# Patient Record
Sex: MALE | Race: WHITE | NOT HISPANIC OR LATINO | Employment: UNEMPLOYED | URBAN - METROPOLITAN AREA
[De-identification: names, ages, dates, MRNs, and addresses within clinical notes are randomized per-mention and may not be internally consistent; named-entity substitution may affect disease eponyms.]

---

## 2017-03-14 ENCOUNTER — INITIAL CONSULT (OUTPATIENT)
Dept: OPHTHALMOLOGY | Facility: CLINIC | Age: 57
End: 2017-03-14
Payer: MEDICAID

## 2017-03-14 VITALS — DIASTOLIC BLOOD PRESSURE: 86 MMHG | SYSTOLIC BLOOD PRESSURE: 130 MMHG | HEART RATE: 93 BPM

## 2017-03-14 DIAGNOSIS — H33.022 RETINAL DETACHMENT OF LEFT EYE WITH MULTIPLE RETINAL TEARS: ICD-10-CM

## 2017-03-14 PROCEDURE — 99213 OFFICE O/P EST LOW 20 MIN: CPT | Mod: PBBFAC | Performed by: OPHTHALMOLOGY

## 2017-03-14 PROCEDURE — 76512 OPH US DX B-SCAN: CPT | Mod: 50,PBBFAC | Performed by: OPHTHALMOLOGY

## 2017-03-14 PROCEDURE — 99999 PR PBB SHADOW E&M-EST. PATIENT-LVL III: CPT | Mod: PBBFAC,,, | Performed by: OPHTHALMOLOGY

## 2017-03-14 PROCEDURE — 92225 PR SPECIAL EYE EXAM, INITIAL: CPT | Mod: 50,PBBFAC | Performed by: OPHTHALMOLOGY

## 2017-03-14 PROCEDURE — 92225 PR SPECIAL EYE EXAM, INITIAL: CPT | Mod: S$PBB,LT,, | Performed by: OPHTHALMOLOGY

## 2017-03-14 PROCEDURE — 92004 COMPRE OPH EXAM NEW PT 1/>: CPT | Mod: S$PBB,,, | Performed by: OPHTHALMOLOGY

## 2017-03-14 RX ORDER — NAPROXEN 500 MG/1
TABLET ORAL
COMMUNITY
Start: 2016-12-20

## 2017-03-14 NOTE — LETTER
March 14, 2017      Paul Lemon MD  [address]  [address]  [facility] [place] [address]           [facility]  [address]  [place] [address]  Phone: [phone]  Fax: [phone]          Patient: Christofer Brenner   MR Number: [id number]   YOB: 1960   Date of Visit: 3/14/2017       Dear Dr. Paul Lemon:    Thank you for referring Christofer Brenner to me for evaluation. Attached you will find relevant portions of my assessment and plan of care.    If you have questions, please do not hesitate to call me. I look forward to following Christofer Brenner along with you.    Sincerely,    ROZ Castillo MD    Enclosure  CC:  No Recipients    If you would like to receive this communication electronically, please contact [email or web address] or [phone] to request more information on [employer] access.    For providers and/or their staff who would like to refer a patient to [facility], please contact us through our one-stop-shop provider referral line, [facility], at [phone].    If you feel you have received this communication in error or would no longer like to receive these types of communications, please e-mail [email or web address]
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
## 2017-03-14 NOTE — MR AVS SNAPSHOT
[facility] - Ophthalmology  [address]  [facility] [address]  Phone: [phone]  Fax: [phone]                  Christofer Brenner   3/14/2017 10:30 AM   Initial consult    Description:  Male : 1960   Provider:  ROZ Castillo MD   Department:  [facility] Ophthal[facility]           Reason for Visit     Concerns About Ocular Health           Diagnoses this Visit        Comments    Retinal detachment of left eye with multiple retinal tears                To Do List           Goals (5 Years of Data)     None      [facility]     [facility] Nurse Care Line -  Assistance  Registered nurses in the [facility] Center provide clinical advisement, health education, appointment booking, and other advisory services.  Call for this free service at [phone].             Medications           Message regarding Medications     Verify the changes and/or additions to your medication regime listed below are the same as discussed with your clinician today.  If any of these changes or additions are incorrect, please notify your healthcare provider.             Verify that the below list of medications is an accurate representation of the medications you are currently taking.  If none reported, the list may be blank. If incorrect, please contact your healthcare provider. Carry this list with you in case of emergency.           Current Medications     cyclobenzaprine (FLEXERIL) 10 MG tablet Take 10 mg by mouth 2 (two) times daily.    diazepam (VALIUM) 10 MG Tab Take 10 mg by mouth 2 (two) times daily.     hydrocodone-acetaminophen 10-325mg (NORCO)  mg Tab Take by mouth 3 (three) times daily as needed.    naproxen (NAPROSYN) 500 MG tablet     thiamine 100 MG tablet Take 1 tablet (100 mg total) by mouth once daily.    folic acid (FOLVITE) 1 MG tablet Take 1 tablet (1 mg total) by mouth once daily.           Clinical Reference Information           Your Vitals Were     BP Pulse                 130/86 (BP Location: Left arm, Patient Position: Sitting, BP Method: Automatic) 93          Blood Pressure          Most Recent Value    BP  130/86      Allergies as of 3/14/2017     No Known Allergies      Immunizations Administered on Date of Encounter - 3/14/2017     None      [facility] Sign-Up     Activating your [facility] account is as easy as 1-2-3!     1) Visit [email or web address], select Sign Up Now, enter this activation code and your date of birth, then select Next.  Activation code not generated  Current Patient Portal Status: Account disabled      2) Create a username and password to use when you visit [facility] in the future and select a security question in case you lose your password and select Next.    3) Enter your e-mail address and click Sign Up!    Additional Information  If you have questions, please e-mail [email or web address] or call [phone] to talk to our [facility] staff. Remember, [facility] is NOT to be used for urgent needs. For medical emergencies, dial 911.         Smoking Cessation     If you would like to quit smoking:   You may be eligible for free services if you are a [place] resident and started smoking cigarettes before September 1, 1988.  Call the Smoking Cessation Trust ([facility]) toll free at (224) 366-7453 or [phone].   Call 1-225-QUIT-NOW if you do not meet the above criteria.            Language Assistance Services     ATTENTION: Language assistance services are available, free of charge. Please call [phone].      ATENCIÓN: Si habla español, tiene a ha disposición servicios gratuitos de asistencia lingüística. Llame al [phone].     CHÚ Ý: N?u b?n nói Ti?ng Vi?t, có các d?ch v? h? tr? ngôn ng? mi?n phí dành cho b?n. G?i s? [phone].         [facility] complies with applicable Federal civil rights laws and does not discriminate on the basis of race, color, national origin, age, disability, or sex.

## 2017-11-11 ENCOUNTER — HOSPITAL ENCOUNTER (EMERGENCY)
Facility: OTHER | Age: 57
Discharge: HOME | End: 2017-11-11
Attending: EMERGENCY MEDICINE
Payer: MEDICAID

## 2017-11-11 VITALS
TEMPERATURE: 98 F | RESPIRATION RATE: 19 BRPM | HEART RATE: 86 BPM | SYSTOLIC BLOOD PRESSURE: 143 MMHG | HEIGHT: 67 IN | DIASTOLIC BLOOD PRESSURE: 93 MMHG | BODY MASS INDEX: 24.33 KG/M2 | OXYGEN SATURATION: 97 % | WEIGHT: 155 LBS

## 2017-11-11 DIAGNOSIS — S09.90XA HEAD INJURY WITHOUT FRACTURE OF SKULL, INITIAL ENCOUNTER: ICD-10-CM

## 2017-11-11 DIAGNOSIS — S01.81XA: Primary | ICD-10-CM

## 2017-11-11 PROCEDURE — 12011 RPR F/E/E/N/L/M 2.5 CM/<: CPT

## 2017-11-11 PROCEDURE — 99284 EMERGENCY DEPT VISIT MOD MDM: CPT | Mod: 25
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
## 2017-11-13 ENCOUNTER — NURSE TRIAGE (OUTPATIENT)
Dept: ADMINISTRATIVE | Facility: CLINIC | Age: 57
End: 2017-11-13

## 2017-11-13 NOTE — TELEPHONE ENCOUNTER
Pt has a hematoma under his eye from a fall- pt was seen in the ER for this. Was calling to find out if he could use warm compresses to the eye. Home care advice given    Reason for Disposition   Patient's symptoms are safe to treat at home per nursing judgment    Protocols used: ST NO PROTOCOL CALL: SICK ADULT-A-[place]

## 2018-05-22 ENCOUNTER — HOSPITAL ENCOUNTER (EMERGENCY)
Facility: HOSPITAL | Age: 58
Discharge: HOME | End: 2018-05-22
Attending: EMERGENCY MEDICINE
Payer: MEDICAID

## 2018-05-22 VITALS
OXYGEN SATURATION: 99 % | BODY MASS INDEX: 24.33 KG/M2 | HEIGHT: 67 IN | RESPIRATION RATE: 16 BRPM | TEMPERATURE: 98 F | DIASTOLIC BLOOD PRESSURE: 91 MMHG | WEIGHT: 155 LBS | SYSTOLIC BLOOD PRESSURE: 145 MMHG | HEART RATE: 79 BPM

## 2018-05-22 DIAGNOSIS — F10.929 ALCOHOL INTOXICATION: Primary | ICD-10-CM

## 2018-05-22 LAB
ALBUMIN SERPL BCP-MCNC: 4.4 G/DL
ALP SERPL-CCNC: 48 U/L
ALT SERPL W/O P-5'-P-CCNC: 360 U/L
AMPHET+METHAMPHET UR QL: NEGATIVE
ANION GAP SERPL CALC-SCNC: 11 MMOL/L
AST SERPL-CCNC: 79 U/L
BARBITURATES UR QL SCN>200 NG/ML: NEGATIVE
BASOPHILS # BLD AUTO: 0.03 K/UL
BASOPHILS NFR BLD: 0.5 %
BENZODIAZ UR QL SCN>200 NG/ML: NORMAL
BILIRUB SERPL-MCNC: 0.3 MG/DL
BUN SERPL-MCNC: 16 MG/DL
BZE UR QL SCN: NEGATIVE
CALCIUM SERPL-MCNC: 9.5 MG/DL
CANNABINOIDS UR QL SCN: NEGATIVE
CHLORIDE SERPL-SCNC: 104 MMOL/L
CO2 SERPL-SCNC: 23 MMOL/L
CREAT SERPL-MCNC: 0.9 MG/DL
CREAT UR-MCNC: 104 MG/DL
DIFFERENTIAL METHOD: ABNORMAL
EOSINOPHIL # BLD AUTO: 0.2 K/UL
EOSINOPHIL NFR BLD: 2.9 %
ERYTHROCYTE [DISTWIDTH] IN BLOOD BY AUTOMATED COUNT: 13.6 %
EST. GFR  (AFRICAN AMERICAN): >60 ML/MIN/1.73 M^2
EST. GFR  (NON AFRICAN AMERICAN): >60 ML/MIN/1.73 M^2
ETHANOL SERPL-MCNC: 18 MG/DL
GLUCOSE SERPL-MCNC: 88 MG/DL
HCT VFR BLD AUTO: 40.5 %
HGB BLD-MCNC: 13.7 G/DL
IMM GRANULOCYTES # BLD AUTO: 0.02 K/UL
IMM GRANULOCYTES NFR BLD AUTO: 0.4 %
LIPASE SERPL-CCNC: 20 U/L
LYMPHOCYTES # BLD AUTO: 1.5 K/UL
LYMPHOCYTES NFR BLD: 27.2 %
MCH RBC QN AUTO: 32.9 PG
MCHC RBC AUTO-ENTMCNC: 33.8 G/DL
MCV RBC AUTO: 97 FL
METHADONE UR QL SCN>300 NG/ML: NEGATIVE
MONOCYTES # BLD AUTO: 0.4 K/UL
MONOCYTES NFR BLD: 7.2 %
NEUTROPHILS # BLD AUTO: 3.4 K/UL
NEUTROPHILS NFR BLD: 61.8 %
NRBC BLD-RTO: 0 /100 WBC
OPIATES UR QL SCN: NORMAL
PCP UR QL SCN>25 NG/ML: NEGATIVE
PLATELET # BLD AUTO: 155 K/UL
PMV BLD AUTO: 9 FL
POTASSIUM SERPL-SCNC: 4 MMOL/L
PROT SERPL-MCNC: 7.9 G/DL
RBC # BLD AUTO: 4.17 M/UL
SODIUM SERPL-SCNC: 138 MMOL/L
TOXICOLOGY INFORMATION: NORMAL
WBC # BLD AUTO: 5.56 K/UL

## 2018-05-22 PROCEDURE — 83690 ASSAY OF LIPASE: CPT

## 2018-05-22 PROCEDURE — 85025 COMPLETE CBC W/AUTO DIFF WBC: CPT

## 2018-05-22 PROCEDURE — 80320 DRUG SCREEN QUANTALCOHOLS: CPT

## 2018-05-22 PROCEDURE — 96361 HYDRATE IV INFUSION ADD-ON: CPT

## 2018-05-22 PROCEDURE — 80053 COMPREHEN METABOLIC PANEL: CPT

## 2018-05-22 PROCEDURE — 99285 EMERGENCY DEPT VISIT HI MDM: CPT | Mod: 25

## 2018-05-22 PROCEDURE — 93005 ELECTROCARDIOGRAM TRACING: CPT

## 2018-05-22 PROCEDURE — 80307 DRUG TEST PRSMV CHEM ANLYZR: CPT

## 2018-05-22 PROCEDURE — 25000003 PHARM REV CODE 250: Performed by: EMERGENCY MEDICINE

## 2018-05-22 PROCEDURE — 96360 HYDRATION IV INFUSION INIT: CPT

## 2018-05-22 PROCEDURE — 93010 ELECTROCARDIOGRAM REPORT: CPT | Mod: ,,, | Performed by: INTERNAL MEDICINE

## 2018-05-22 RX ADMIN — SODIUM CHLORIDE 1000 ML: 0.9 INJECTION, SOLUTION INTRAVENOUS at 11:05

## 2018-05-22 NOTE — ED NOTES
DISCHARGE PLANNING     Discharge to home or other facility with appropriate resources Progressing        DISCHARGE PLANNING - CARE MANAGEMENT     Discharge to post-acute care or home with appropriate resources Progressing        INFECTION - ADULT     Absence or prevention of progression during hospitalization Progressing     Absence of fever/infection during neutropenic period Progressing        Knowledge Deficit     Patient/family/caregiver demonstrates understanding of disease process, treatment plan, medications, and discharge instructions Progressing        PAIN - ADULT     Verbalizes/displays adequate comfort level or baseline comfort level Progressing        Potential for Falls     Patient will remain free of falls Progressing        SAFETY ADULT     Patient will remain free of falls Progressing     Maintain or return to baseline ADL function Progressing     Maintain or return mobility status to optimal level Progressing Pt ambulatory around with nurse to provide standby assistance; mostly steady gait with some swaying noted.  Dr Saini made aware; at bedside to evaluate pt.

## 2018-05-22 NOTE — ED NOTES
Pt states he is unable to provide a urine specimen at this time; refusing catheterization.  Ice water provided along with IVF that are infusing; will continue to encourage pt to provide urine specimen.

## 2018-05-22 NOTE — ED NOTES
Pt resting quietly on stretcher with eyes closed; opens eyes to verbal stimuli.  VS assessed; stable.  Pt updated on wait for MD to discuss results and plan of care; denies needs or complaints at this time.  Bed locked in lowest position; side rail up and locked x 1; bedside table, and personal belongings within reach.  Will continue to monitor pt.

## 2018-05-22 NOTE — ED NOTES
Pt resting quietly on stretcher with eyes closed; opens eyes to verbal stimuli.  Pt again encouraged to provide urine specimen; states he will attempt.  Bed locked in lowest position; side rail up and locked x 1, bedside table, and personal belongings within reach. Pt denies needs or complaints at this time; will continue to monitor pt.
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
## 2018-05-22 NOTE — ED NOTES
LOC: The patient is awake, alert, and oriented to place, time, situation.  Pt appears intoxicated.  Speech is appropriate but slurred.    APPEARANCE: Patient resting comfortably in no acute distress.  Patient is clean and well groomed.    SKIN: The skin is warm and dry; color consistent with ethnicity.  Patient has normal skin turgor and moist mucus membranes.  Skin intact; no breakdown or bruising noted.     MUSCULOSKELETAL: Patient moving upper and lower extremities without difficulty.  Denies weakness.     RESPIRATORY: Airway is open and patent. Respirations spontaneous, even, easy, and non-labored.  Patient has a normal effort and rate.  No accessory muscle use noted. Denies cough.     CARDIAC:  Normal rate noted.  No peripheral edema noted. No complaints of chest pain.      ABDOMEN: Soft and non tender to palpation.  No distention noted.     NEUROLOGIC: Eyes open spontaneously.  Behavior appropriate to situation.  Follows commands; facial expression symmetrical.  Purposeful motor response noted; normal sensation in all extremities.

## 2018-05-22 NOTE — ED NOTES
Labs collected as documented.  Pt informed that a urine specimen has been ordered; verbalizes understanding; urinal provided.
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
## 2018-06-12 ENCOUNTER — HOSPITAL ENCOUNTER (EMERGENCY)
Facility: HOSPITAL | Age: 58
Discharge: HOME | End: 2018-06-12
Attending: EMERGENCY MEDICINE
Payer: MEDICAID

## 2018-06-12 VITALS
DIASTOLIC BLOOD PRESSURE: 81 MMHG | WEIGHT: 145 LBS | RESPIRATION RATE: 18 BRPM | BODY MASS INDEX: 23.3 KG/M2 | TEMPERATURE: 99 F | HEIGHT: 66 IN | HEART RATE: 84 BPM | SYSTOLIC BLOOD PRESSURE: 143 MMHG | OXYGEN SATURATION: 96 %

## 2018-06-12 DIAGNOSIS — N30.00 ACUTE CYSTITIS WITHOUT HEMATURIA: ICD-10-CM

## 2018-06-12 DIAGNOSIS — F19.10 SUBSTANCE ABUSE: ICD-10-CM

## 2018-06-12 DIAGNOSIS — R56.9 SEIZURE: Primary | ICD-10-CM

## 2018-06-12 LAB
ALBUMIN SERPL BCP-MCNC: 4.6 G/DL
ALP SERPL-CCNC: 50 U/L
ALT SERPL W/O P-5'-P-CCNC: 30 U/L
AMPHET+METHAMPHET UR QL: NEGATIVE
ANION GAP SERPL CALC-SCNC: 9 MMOL/L
APTT BLDCRRT: 22.3 SEC
AST SERPL-CCNC: 34 U/L
BACTERIA #/AREA URNS AUTO: ABNORMAL /HPF
BARBITURATES UR QL SCN>200 NG/ML: NEGATIVE
BENZODIAZ UR QL SCN>200 NG/ML: NORMAL
BILIRUB SERPL-MCNC: 0.7 MG/DL
BILIRUB UR QL STRIP: NEGATIVE
BUN SERPL-MCNC: 21 MG/DL
BZE UR QL SCN: NEGATIVE
CALCIUM SERPL-MCNC: 10 MG/DL
CANNABINOIDS UR QL SCN: NEGATIVE
CHLORIDE SERPL-SCNC: 105 MMOL/L
CLARITY UR REFRACT.AUTO: CLEAR
CO2 SERPL-SCNC: 25 MMOL/L
COLOR UR AUTO: YELLOW
CREAT SERPL-MCNC: 0.9 MG/DL
CREAT UR-MCNC: 118 MG/DL
EST. GFR  (AFRICAN AMERICAN): >60 ML/MIN/1.73 M^2
EST. GFR  (NON AFRICAN AMERICAN): >60 ML/MIN/1.73 M^2
GLUCOSE SERPL-MCNC: 111 MG/DL
GLUCOSE UR QL STRIP: NEGATIVE
HGB UR QL STRIP: NEGATIVE
INR PPP: 1.1
KETONES UR QL STRIP: ABNORMAL
LEUKOCYTE ESTERASE UR QL STRIP: ABNORMAL
MAGNESIUM SERPL-MCNC: 2.6 MG/DL
METHADONE UR QL SCN>300 NG/ML: NEGATIVE
MICROSCOPIC COMMENT: ABNORMAL
NITRITE UR QL STRIP: NEGATIVE
OPIATES UR QL SCN: NEGATIVE
PCP UR QL SCN>25 NG/ML: NEGATIVE
PH UR STRIP: 5 [PH] (ref 5–8)
POTASSIUM SERPL-SCNC: 3.8 MMOL/L
PROT SERPL-MCNC: 8 G/DL
PROT UR QL STRIP: NEGATIVE
PROTHROMBIN TIME: 11 SEC
SODIUM SERPL-SCNC: 139 MMOL/L
SP GR UR STRIP: 1.01 (ref 1–1.03)
TOXICOLOGY INFORMATION: NORMAL
URN SPEC COLLECT METH UR: ABNORMAL
UROBILINOGEN UR STRIP-ACNC: NEGATIVE EU/DL
WBC #/AREA URNS AUTO: 8 /HPF (ref 0–5)

## 2018-06-12 PROCEDURE — 81001 URINALYSIS AUTO W/SCOPE: CPT | Mod: 59

## 2018-06-12 PROCEDURE — 83735 ASSAY OF MAGNESIUM: CPT

## 2018-06-12 PROCEDURE — 96374 THER/PROPH/DIAG INJ IV PUSH: CPT

## 2018-06-12 PROCEDURE — 96361 HYDRATE IV INFUSION ADD-ON: CPT

## 2018-06-12 PROCEDURE — 80053 COMPREHEN METABOLIC PANEL: CPT

## 2018-06-12 PROCEDURE — 99284 EMERGENCY DEPT VISIT MOD MDM: CPT | Mod: 25

## 2018-06-12 PROCEDURE — 85610 PROTHROMBIN TIME: CPT

## 2018-06-12 PROCEDURE — 80307 DRUG TEST PRSMV CHEM ANLYZR: CPT

## 2018-06-12 PROCEDURE — 99285 EMERGENCY DEPT VISIT HI MDM: CPT | Mod: ,,, | Performed by: EMERGENCY MEDICINE

## 2018-06-12 PROCEDURE — 25000003 PHARM REV CODE 250: Performed by: EMERGENCY MEDICINE

## 2018-06-12 PROCEDURE — 85730 THROMBOPLASTIN TIME PARTIAL: CPT

## 2018-06-12 PROCEDURE — 96375 TX/PRO/DX INJ NEW DRUG ADDON: CPT

## 2018-06-12 PROCEDURE — 63600175 PHARM REV CODE 636 W HCPCS: Performed by: EMERGENCY MEDICINE

## 2018-06-12 RX ORDER — LORAZEPAM 2 MG/ML
1 INJECTION INTRAMUSCULAR
Status: COMPLETED | OUTPATIENT
Start: 2018-06-12 | End: 2018-06-12

## 2018-06-12 RX ORDER — CHLORDIAZEPOXIDE HYDROCHLORIDE 25 MG/1
25 CAPSULE, GELATIN COATED ORAL 3 TIMES DAILY
Qty: 24 CAPSULE | Refills: 0 | Status: SHIPPED | OUTPATIENT
Start: 2018-06-12 | End: 2018-07-12

## 2018-06-12 RX ORDER — CEPHALEXIN 500 MG/1
500 CAPSULE ORAL EVERY 12 HOURS
Qty: 14 CAPSULE | Refills: 0 | Status: SHIPPED | OUTPATIENT
Start: 2018-06-12 | End: 2018-06-17

## 2018-06-12 RX ORDER — CEFTRIAXONE 1 G/1
1 INJECTION, POWDER, FOR SOLUTION INTRAMUSCULAR; INTRAVENOUS
Status: COMPLETED | OUTPATIENT
Start: 2018-06-12 | End: 2018-06-12

## 2018-06-12 RX ADMIN — SODIUM CHLORIDE 1000 ML: 0.9 INJECTION, SOLUTION INTRAVENOUS at 07:06

## 2018-06-12 RX ADMIN — CEFTRIAXONE SODIUM 1 G: 1 INJECTION, POWDER, FOR SOLUTION INTRAMUSCULAR; INTRAVENOUS at 08:06

## 2018-06-12 RX ADMIN — LORAZEPAM 1 MG: 2 INJECTION, SOLUTION INTRAMUSCULAR; INTRAVENOUS at 07:06

## 2018-06-12 NOTE — ED NOTES
Patient identifiers verified and correct for Christofer Brenner.  LOC: The patient is awake, alert and aware of environment with an appropriate affect, the patient is oriented x 3 and speaking appropriately.   APPEARANCE: Patient appears comfortable and in no acute distress, patient is clean and well groomed.  SKIN: The skin is warm and dry, color consistent with ethnicity, patient has normal skin turgor and moist mucus membranes, skin intact, no breakdown or bruising noted.   MUSCULOSKELETAL: Patient moving all extremities spontaneously, no swelling noted.  RESPIRATORY: Airway is open and patent, respirations are spontaneous, patient has a normal effort and rate, no accessory muscle use noted, pt placed on continuous pulse ox with O2 sats noted at 97% on room air.  CARDIAC: Pt placed on cardiac monitor. Patient has a normal rate and regular rhythm, no edema noted, capillary refill < 3 seconds.   GASTRO: Soft and non tender to palpation, no distention noted, normoactive bowel sounds present in all four quadrants. Pt states bowel movements have been regular.  : Pt denies any pain or frequency with urination.  NEURO: Pt opens eyes spontaneously, behavior appropriate to situation, follows commands, facial expression symmetrical, bilateral hand grasp equal and even, purposeful motor response noted, normal sensation in all extremities when touched with a finger.

## 2018-06-12 NOTE — ED PROVIDER NOTES
Encounter Date: 6/12/2018    SCRIBE #1 NOTE: I, Ledy Mosley, am scribing for, and in the presence of, Dr. Mccallum.       History     Chief Complaint   Patient presents with    Seizures     three seizures in last hour. not on seizure medication. last seizure on way to ER     Time seen by provider: 6:32 PM    This is a 58 y.o. male with medical problems including arthritis, scoliosis, and chronic back pain who presents s/p 3 seizures in the last hour. Seizures were witnessed by pt's friend who states the first episode lasted 3 minutes with the last 2 lasting 1 minute. He reports that there was no tongue bite and that pt appeared to be talking normally after the seizures. He did not hit his head today but he is unsure if he hit his head a few days ago after a possible seizure. He has abrasions to his knees and elbows from episode a few days ago. Friend states pt has not been sleeping for the last week and a half and appears to be manic. Pt is not on seizure medication and notes he is currently attempting to stop all alcohol use. Last drink was 2 shots of scotch 2 days ago. He is also attempting to scale back on all prescribed medications including benzos and ativan. Pt is a smoker. 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 He denies nausea, fever, chills, and difficulty urinating.       The history is provided by the patient and medical records.     Review of patient's allergies indicates:  No Known Allergies  Past Medical History:   Diagnosis Date    Arthritis     Chronic back pain     Scoliosis      Past Surgical History:   Procedure Laterality Date    CATARACT EXTRACTION Left     RETINAL DETACHMENT SURGERY Left      History reviewed. No pertinent family history.  Social History   Substance Use Topics    Smoking status: Current Every Day Smoker     Packs/day: 0.50     Years: 35.00     Types: Cigarettes    Smokeless tobacco: Former User     Alcohol use 6.6 oz/week     3 Shots of liquor, 8 Standard drinks or equivalent per week     Review of Systems   Constitutional: Negative for chills and fever.   Genitourinary: Negative for difficulty urinating.   Skin:        Positive for abrasions to knees and elbows.    Neurological: Positive for seizures.   Psychiatric/Behavioral: Positive for sleep disturbance. The patient is nervous/anxious.    All other systems reviewed and are negative.      Physical Exam     Initial Vitals [06/12/18 1620]   BP Pulse Resp Temp SpO2   131/81 90 18 98.6 °F (37 °C) 96 %      MAP       --         Physical Exam  PHYSICAL EXAM:  Vital signs and nursing assessment noted:    GEN:   NAD, A & Ox3, atraumatic, well appearing, nontoxic appearing  HEENT:  PERRLA, EOMI, moist membranes, nl conjunctiva, no scleral icterus, no nystagmus, no nodes/nodules, soft, supple, FROM, no trachial deviation, nexus negative  CV:   RRR no m/r/g, 2+ radial pulses, <2sec cap refill, no obvious JVD  RESP:  CTA B, no w/r/r, equal and bilateral chest rise, no respiratory distress  ABD:   soft, Nontender, Nondistended, +BS, no guarding/rebound  BACK:  FROM, no midline tenderness, no paraspinal tenderness  :   Deferred  EXT:   FROM, REYES x 4, no edema, no calf tenderness, no swelling  LYMPH:  no gross adenopathy  NEURO:  CN II-XII grossly intact, no obvious motor/sensory deficit, no tremor, negative Romberg,  nl gait/coordination  PSYCH:   no SI/HI,  nl mood/affect, nl judgement/thought process. Tangential speech. Restless.  SKIN:  Warm, dry, intact, no rashes or masses, nl color, no pallor. Abrasions on elbows and knee of indeterminate age.   ED Course   Procedures  Labs Reviewed   URINALYSIS, REFLEX TO URINE CULTURE - Abnormal; Notable for the following:        Result Value    Ketones, UA Trace (*)     Leukocytes, UA Trace (*)     All other components within normal limits    Narrative:     Preferred Collection Type->Urine, Clean Catch  CUP   COMPREHENSIVE  METABOLIC PANEL - Abnormal; Notable for the following:     Glucose 111 (*)     BUN, Bld 21 (*)     Alkaline Phosphatase 50 (*)     All other components within normal limits   URINALYSIS MICROSCOPIC - Abnormal; Notable for the following:     WBC, UA 8 (*)     All other components within normal limits    Narrative:     Preferred Collection Type->Urine, Clean Catch  CUP   MAGNESIUM   DRUG SCREEN PANEL, URINE EMERGENCY    Narrative:     Preferred Collection Type->Urine, Clean Catch  CUP   APTT   PROTIME-INR   POCT GLUCOSE MONITORING CONTINUOUS          CT Head Without Contrast   Final Result      No acute abnormality.      Electronically signed by resident: Amarjit Weinberg   Date:    2018   Time:    19:12      Electronically signed by: Deondre Graham MD   Date:    2018   Time:    19:36           Medical Decision Making:   History:   Old Medical Records: I decided to obtain old medical records.  Clinical Tests:   Lab Tests: Ordered  Radiological Study: Ordered            Scribe Attestation:   Scribe #1: I performed the above scribed service and the documentation accurately describes the services I performed. I attest to the accuracy of the note.    MEDICAL DECISION MAKINM PMHx Chronic pain, Anxiety presents with seizure x 3 after tapering his valium and norco.  Admits to alcohol use last 2 days ago.       Seizure differential includes but not exclusive to:  metabolic disturbances, ICH, poisoning, medication side effect, UTI,  Dehydration,malingering, substance abuse    Treatment plan includes physical exam, cardiac monitoring, labs, imaging studies,  and supportive care.  Labs and imaging studies reviewed and independently interpreted:        ED Course as of 2027   Tue  Abnormal UA with +esterase WBC, UA: (!) 8 [NO]   1938 Unremarkable CMP Creatinine: 0.9 [NO]    unremarkable Coumadin Monitoring INR: 1.1 [NO]   193 Patient on valium and received ativan in ED Benzodiazepines:  Presumptive Positive [NO]   1940 NAD CT Head Without Contrast [NO]      ED Course User Index  [NO] Zachery Mccallum MD   Patient improved after treatment and tolerating PO.    Patient updated on care.  Pt agrees with assessment, disposition and treatment plan and has no further questions or complaints at this time. Educated on the importance of alcohol cessation and using medications as prescribed.  Discussed potentially starting antiepileptics once infection cleared and benzo dependence better controlled. Given return precautions and demonstrates understanding.    Patient will  follow up with neurology and/or primary care physician as an outpatient.  Prescription given: Librium    Clinical Impression:   The primary encounter diagnosis was Seizure. Diagnoses of Substance abuse and Acute cystitis without hematuria were also pertinent to this visit.      Disposition:   Disposition: Discharged  Condition: Stable           I, Dr. Zachery Mccallum, personally performed the services described in this documentation. All medical record entries made by the scribe were at my direction and in my presence.  I have reviewed the chart and agree that the record reflects my personal performance and is accurate and complete. Zachrey Mccallum MD.  8:44 PM 06/13/2018               Zachery Mccallum MD  06/13/18 2044

## 2018-06-12 NOTE — ED TRIAGE NOTES
Pt reports to ED with c/o seizures. Pt report having seizures today x3 Per family 1st was bout 2min and others about 45sec. Pt denies loss of bladder or bowels. Pt reports being on narcotic therapy for chronic back pain. Hx scoliosis, narcotic use. Per family pt stopped taking opoids, benzos, as well as drinking about two weeks ago which has lead to the seizures, family also reports pt hasn't slept in over a week. Pt denies chest pain, SOB, or changes in elimination.